# Patient Record
Sex: FEMALE | Race: WHITE | Employment: FULL TIME | URBAN - METROPOLITAN AREA
[De-identification: names, ages, dates, MRNs, and addresses within clinical notes are randomized per-mention and may not be internally consistent; named-entity substitution may affect disease eponyms.]

---

## 2021-10-14 ENCOUNTER — OFFICE VISIT (OUTPATIENT)
Dept: PRIMARY CARE CLINIC | Age: 51
End: 2021-10-14
Payer: COMMERCIAL

## 2021-10-14 VITALS
WEIGHT: 172.6 LBS | HEART RATE: 76 BPM | BODY MASS INDEX: 30.58 KG/M2 | SYSTOLIC BLOOD PRESSURE: 126 MMHG | RESPIRATION RATE: 16 BRPM | DIASTOLIC BLOOD PRESSURE: 84 MMHG | OXYGEN SATURATION: 98 % | HEIGHT: 63 IN

## 2021-10-14 DIAGNOSIS — Z13.0 SCREENING, ANEMIA, DEFICIENCY, IRON: ICD-10-CM

## 2021-10-14 DIAGNOSIS — G47.9 DIFFICULTY SLEEPING: ICD-10-CM

## 2021-10-14 DIAGNOSIS — Z12.11 COLON CANCER SCREENING: ICD-10-CM

## 2021-10-14 DIAGNOSIS — Z13.29 SCREENING FOR THYROID DISORDER: ICD-10-CM

## 2021-10-14 DIAGNOSIS — Z12.31 ENCOUNTER FOR SCREENING MAMMOGRAM FOR BREAST CANCER: ICD-10-CM

## 2021-10-14 DIAGNOSIS — Z13.6 SCREENING FOR CARDIOVASCULAR CONDITION: ICD-10-CM

## 2021-10-14 DIAGNOSIS — Z78.0 POSTMENOPAUSAL: ICD-10-CM

## 2021-10-14 DIAGNOSIS — G44.209 ACUTE NON INTRACTABLE TENSION-TYPE HEADACHE: ICD-10-CM

## 2021-10-14 DIAGNOSIS — F41.9 ANXIETY: Primary | ICD-10-CM

## 2021-10-14 PROCEDURE — 99204 OFFICE O/P NEW MOD 45 MIN: CPT | Performed by: NURSE PRACTITIONER

## 2021-10-14 RX ORDER — TRIAMCINOLONE ACETONIDE 55 UG/1
2 SPRAY, METERED NASAL DAILY
COMMUNITY

## 2021-10-14 RX ORDER — ESCITALOPRAM OXALATE 10 MG/1
10 TABLET ORAL DAILY
Qty: 30 TABLET | Refills: 5 | Status: SHIPPED | OUTPATIENT
Start: 2021-10-14

## 2021-10-14 SDOH — ECONOMIC STABILITY: FOOD INSECURITY: WITHIN THE PAST 12 MONTHS, THE FOOD YOU BOUGHT JUST DIDN'T LAST AND YOU DIDN'T HAVE MONEY TO GET MORE.: NEVER TRUE

## 2021-10-14 SDOH — ECONOMIC STABILITY: FOOD INSECURITY: WITHIN THE PAST 12 MONTHS, YOU WORRIED THAT YOUR FOOD WOULD RUN OUT BEFORE YOU GOT MONEY TO BUY MORE.: NEVER TRUE

## 2021-10-14 ASSESSMENT — SOCIAL DETERMINANTS OF HEALTH (SDOH): HOW HARD IS IT FOR YOU TO PAY FOR THE VERY BASICS LIKE FOOD, HOUSING, MEDICAL CARE, AND HEATING?: NOT HARD AT ALL

## 2021-10-14 ASSESSMENT — ENCOUNTER SYMPTOMS
DIARRHEA: 0
SORE THROAT: 0
TROUBLE SWALLOWING: 0
VOMITING: 0
SINUS PRESSURE: 0
CONSTIPATION: 0
NAUSEA: 0
COUGH: 0
ABDOMINAL PAIN: 0
SHORTNESS OF BREATH: 0
BLOOD IN STOOL: 0
WHEEZING: 0

## 2021-10-14 ASSESSMENT — PATIENT HEALTH QUESTIONNAIRE - PHQ9
2. FEELING DOWN, DEPRESSED OR HOPELESS: 0
SUM OF ALL RESPONSES TO PHQ9 QUESTIONS 1 & 2: 0
SUM OF ALL RESPONSES TO PHQ QUESTIONS 1-9: 0
SUM OF ALL RESPONSES TO PHQ QUESTIONS 1-9: 0
1. LITTLE INTEREST OR PLEASURE IN DOING THINGS: 0
SUM OF ALL RESPONSES TO PHQ QUESTIONS 1-9: 0

## 2021-10-14 NOTE — PROGRESS NOTES
[address] PRIMARY CARE  [address] [facility] 15[address]  [address] [address]  Dept: [phone]  Dept Fax: [phone]    Arpit Williamson is a 46 y.o. female who presentstoday for her medical conditions/complaints as noted below. Arpit Williamson is c/o of  Chief Complaint   Patient presents with   [facility] Patient     Establish Care. HPI:     Here today to establish care as new patient  Minimal past and current medical history, she is not on any medications  She works full-time as a   Reports her spouse is retired/on disability due to Luite Robb 87  She has 3 children who are all in [facility] 85 to follow healthy diet, maintains active lifestyle  Past due for Pap smear and mammogram, has never had colonoscopy    Voicing concern about increased stress, anxiety and difficulty sleeping  She states she has never been on medication or felt this way in the past  She is hesitant about medications, wondering about counseling  Denies any suicidal ideation  States she just feels that it situational given her oldest child is moving to [place], her spouses health is declining, and her new job is very stressful      No results found for: Barronett Amari          ( goal A1C is < 7)   No results found for: LABMICR  No results found for: LDLCHOLESTEROL, [address]    (goal LDL is <100)   No results found for: AST, ALT, BUN  BP Readings from Last 3 Encounters:   10/14/21 126/84          ([id number])    History reviewed. No pertinent past medical history. Past Surgical History:   Procedure Laterality Date    BREAST REDUCTION SURGERY       SECTION      x 3       Family History   Problem Relation Age of Onset    Heart Disease Father           Social History     Tobacco Use    Smoking status: Never Smoker    Smokeless tobacco: Never Used   Substance Use Topics    Alcohol use:  Yes     Alcohol/week: 5.0 standard drinks     Types: 5 Glasses of wine per week      Current Outpatient Medications   Medication Sig Dispense Refill    triamcinolone (NASACORT) 55 MCG/ACT nasal inhaler 2 sprays by Nasal route daily      escitalopram (LEXAPRO) 10 MG tablet Take 1 tablet by mouth daily 30 tablet 5     No current facility-administered medications for this visit. Allergies   Allergen Reactions    Seasonal        Health Maintenance   Topic Date Due    Hepatitis C screen  Never done    HIV screen  Never done    Cervical cancer screen  Never done    Lipid screen  Never done    Diabetes screen  Never done    Colon cancer screen colonoscopy  Never done    Breast cancer screen  Never done    Shingles Vaccine (1 of 2) Never done    Flu vaccine (1) Never done    DTaP/Tdap/Td vaccine (2 - Td or Tdap) 10/22/2028    COVID-19 Vaccine  Completed    Hepatitis A vaccine  Aged Out    Hepatitis B vaccine  Aged Out    Hib vaccine  Aged Out    Meningococcal (ACWY) vaccine  Aged Out    Pneumococcal 0-64 years Vaccine  Aged Out       Subjective:      Review of Systems   Constitutional: Negative for activity change, appetite change, chills, fatigue, fever and unexpected weight change. HENT: Negative for congestion, ear pain, hearing loss, sinus pressure, sore throat and trouble swallowing. Eyes: Negative for visual disturbance. Respiratory: Negative for cough, shortness of breath and wheezing. Cardiovascular: Negative for chest pain, palpitations and leg swelling. Gastrointestinal: Negative for abdominal pain, blood in stool, constipation, diarrhea, nausea and vomiting. Endocrine: Negative for cold intolerance, heat intolerance, polydipsia, polyphagia and polyuria. Genitourinary: Negative for difficulty urinating, frequency, hematuria and urgency. Musculoskeletal: Negative for arthralgias and myalgias. Skin: Negative for rash. Allergic/Immunologic: Positive for environmental allergies. Neurological: Positive for headaches (weekly).  Negative for dizziness, weakness and light-headedness. Psychiatric/Behavioral: Positive for sleep disturbance. Negative for confusion. The patient is not nervous/anxious. Objective:     Physical Exam  Constitutional:       Appearance: She is well-developed. HENT:      Head: Normocephalic. Eyes:      Conjunctiva/sclera: Conjunctivae normal.      Pupils: Pupils are equal, round, and reactive to light. Cardiovascular:      Rate and Rhythm: Normal rate and regular rhythm. Heart sounds: Normal heart sounds. No murmur heard. Pulmonary:      Effort: Pulmonary effort is normal.      Breath sounds: Normal breath sounds. No wheezing. Abdominal:      General: Bowel sounds are normal. There is no distension. Palpations: Abdomen is soft. Musculoskeletal:         General: Normal range of motion. Cervical back: Normal range of motion. Skin:     General: Skin is warm and dry. Neurological:      Mental Status: She is alert and oriented to person, place, and time. Psychiatric:         Behavior: Behavior normal.         Thought Content: Thought content normal.         Judgment: Judgment normal.       /84   Pulse 76   Resp 16   Ht 5' 2.5\" (1.588 m)   Wt 172 lb 9.6 oz (78.3 kg)   SpO2 98%   BMI 31.07 kg/m²     Assessment:       Diagnosis Orders   1. [address]    escitalopram (LEXAPRO) 10 MG tablet   2. Colon cancer screening  [facility]) - Dionisio Caraballo IV, DO, General Surgery, [place]   3. Encounter for screening mammogram for breast cancer  RYNE Digital Screen Bilateral [[id number]]   4. Screening for cardiovascular condition  Comprehensive Metabolic Panel    Lipid Panel   5. Screening for thyroid disorder  TSH without Reflex   6. Screening, anemia, deficiency, iron  CBC Auto Differential   7. Postmenopausal  [facility] [place]   8. Acute non intractable tension-type headache     9.  Difficulty sleeping               Plan:      Return in about 6 weeks (around 11/25/2021) for depression/anxiety. Established care as new patient, she is interested in all health maintenance, screening labs, mammogram and referral for GYN exam and colonoscopy provided  Headaches-managing at this time with over-the-counter ibuprofen. Reviewed benefits of adequate hydration, taking medication at initial onset of headache  Anxiety, difficulty sleeping-lengthy discussion, she is interested in counseling, referral placed. Also provided with prescription for Lexapro, she states she may hold off until she meets with counselor to start the medication.   Discussed benefits of medications, potential side effects and reportable adverse effects  Orders Placed This Encounter   Procedures    RYNE Digital Screen Bilateral [[id number]]     Standing Status:   Future     Standing Expiration Date:   10/14/2022     Order Specific Question:   Reason for exam:     Answer:   screening    CBC Auto Differential     Standing Status:   Future     Standing Expiration Date:   10/14/2022    Comprehensive Metabolic Panel     Standing Status:   Future     Standing Expiration Date:   10/14/2022    Lipid Panel     Standing Status:   Future     Standing Expiration Date:   10/14/2022     Order Specific Question:   Is Patient Fasting?/# of Hours     Answer:   8    TSH without Reflex     Standing Status:   Future     Standing Expiration Date:   10/14/2022   [address] [place] [address]     Referral Priority:   Routine     Referral Type:   Eval and Treat     Referral Reason:   Specialty Services Required     Referred to Provider:   Aarti Petit, [address]     Requested Specialty:   Behavioral Health     Number of Visits Requested:   1    [facility]) - Keyana Palomares DO, General Surgery, [place]     Referral Priority:   Routine     Referral Type:   Eval and Treat     Referral Reason:   Specialty Services Required     Referred to Provider:   Swathi Bryan DO     Requested Specialty:   General Surgery Number of Visits Requested:   [address], CNP, Gynecology, [place] [address] [place]     Referral Priority:   Routine     Referral Type:   Eval and Treat     Referral Reason:   Specialty Services Required     Referred to Provider:   BELINDA Oconnor CNP     Requested Specialty:   Family Nurse Practitioner     Number of Visits Requested:   1        Orders Placed This Encounter   Medications    escitalopram (LEXAPRO) 10 MG tablet     Sig: Take 1 tablet by mouth daily     Dispense:  30 tablet     Refill:  5       Patient given educational materials - see patient instructions. Discussed use, benefit, and side effects of prescribed medications. All patientquestions answered. Pt voiced understanding. Reviewed health maintenance. Instructedto continue current medications, diet and exercise. Patient agreed with treatmentplan. Follow up as directed.      Electronicallysigned by BELINDA Duarte CNP on 10/14/2021 at 12:46 PM

## 2021-10-14 NOTE — PROGRESS NOTES
Is the person to be vaccinated sick today? no    Does the person to be vaccinated have an allergy to eggs or to a component of the vaccine? No    Has the person to be vaccinated ever had a serious reaction to influenza vaccine in the past? Yes    Has the person to be vaccinated ever had Guillan-Selma' Syndrome?  No

## 2021-10-22 ENCOUNTER — CLINICAL DOCUMENTATION (OUTPATIENT)
Dept: BEHAVIORAL/MENTAL HEALTH CLINIC | Age: 51
End: 2021-10-22

## 2021-10-22 NOTE — PROGRESS NOTES
Warm handoff requested by BELINDA Xiao CNP and was completed. Patient scheduled for future virtual health visit.

## 2021-11-30 ENCOUNTER — VIRTUAL VISIT (OUTPATIENT)
Dept: BEHAVIORAL/MENTAL HEALTH CLINIC | Age: 51
End: 2021-11-30
Payer: COMMERCIAL

## 2021-11-30 DIAGNOSIS — F41.9 ANXIETY DISORDER, UNSPECIFIED TYPE: Primary | ICD-10-CM

## 2021-11-30 PROCEDURE — 90791 PSYCH DIAGNOSTIC EVALUATION: CPT | Performed by: SOCIAL WORKER

## 2021-12-10 NOTE — PROGRESS NOTES
ADULT BEHAVIORAL HEALTH ASSESSMENT  MARYBETH Mcgrath  Licensed Independent      Visit Date: 2021   Time of appointment: 2pm   Time spent with Patient: 60 minutes. This is patient's first appointment. Reason for Consult:  New Patient     PCP: BELINDA Landin CNP      Pt and/or guardian was educated on the model of service to include a short-term intervention focused approach and as well as the limits of confidentiality (i.e. abuse reporting, suicide intervention, etc.). Pt and/or guardian indicated understanding. Pt and/or guardian provided informed consent for the behavioral health program.  Visit completed via audio and video and consent for virtual visit was given. .   Patient Location: privately in pt home, [facility]  Provider Location: [place] [facility] office, Count includes the [facility]  Penny Cedillo is a 46 y.o. female who presents for new evaluation and treatment of anxiety and stress. She reports the following symptoms: frequent worry about a variety of things, scattered, difficulty completing a task well, overwhelmed, waking easily at night and difficulty returning to sleep, racing mind. Symptoms are causing impairment in her activities of daily living and family relationships. Onset of symptoms was approximately 8 . Symptoms occur daily and have been gradually worsening since onset. Stressors contributing to the presenting problem include: Pt brings up issues in family of origin and states that brother  of a drug overdose around the time symptoms began. Pt is frequently stressed and worrying about her family and the living brother that contacts her throughout the week who is also dealing with addiction issues. Penny Cedillo reports that her main concern and focus for the referral to [facility] is to get anxiety and worrying under control.     CURRENT MEDICATIONS    Current Outpatient Medications:     triamcinolone (NASACORT) 54 MCG/ACT nasal inhaler, 2 sprays by Nasal route daily, Disp: , Rfl:     escitalopram (LEXAPRO) 10 MG tablet, Take 1 tablet by mouth daily, Disp: 30 tablet, Rfl: 5     FAMILY MEDICAL/MH HISTORY   Her family history includes Heart Disease in her father. [address] reports a previous diagnosis of anxiety. No previous treatment. PSYCHOSOCIAL HISTORY  Giovanna Yañez is currently living with , who has MS. She has several young adult children. She is employed in a stressful job. She reports no previous history of trauma. 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 She has never used smokeless tobacco.  ALCOHOL:  She reports current alcohol use of about 5.0 standard drinks of alcohol per week. OTHER SUBSTANCES: She reports no history of drug use. MENTAL STATUS EXAM  Affective and emotional state appeared anxious. Suicidal ideation was denied. Homicidal ideation was denied. Hygiene was good   Dress was appropriate  Behavior was Calm and engaged  Attitude was Cooperative. Eye-contact was good . Speech is described as normal rate, normal volume and well articulated  Thought processes were logical without evidence of delusions, hallucinations, obsessions, or loree  Pt was oriented to person, place, time, and general circumstances with recent memory:  good and remote memory:  good. Insight is estimated to be good AEB a good  understanding of cyclical maladaptive patterns, and the ability to use insight to inform behavior change.    Judgment was estimated to be good    PHQ Scores 10/14/2021   PHQ2 Score 0   PHQ9 Score 0     Interpretation of Total Score Depression Severity: 1-4 = Minimal depression, 5-9 = Mild depression, 10-14 = Moderate depression, 15-19 = Moderately severe depression, 20-27 = Severe depression    ASSESSMENT  Escobar Michaels presented to the appointment today for evaluation and treatment of symptoms of anxiety. She is currently deemed no risk to herself or others and meets criteria for:  Anxiety Disorder, unspecified, AEB presence of anxiety symptoms causing significant distress without meeting full criteria for a specific anxiety disorder. She will benefit from will benefit from brief and solution-focused consultation to address cognitive and behavioral interventions for anxiety symptoms. Erinn Robins and/or guardian were in agreement with recommendations. INTERVENTION  orienting pt to process of brief behavioral health services, completing initial assessment of symptoms and needs, provided recommendations, rapport building and guided discovery    DIAGNOSIS  Erinn Robins was seen today for new patient. Diagnoses and all orders for this visit:    Anxiety disorder, unspecified type        PLAN  Pt will track worry themes/triggers  Follow up in 2 weeks with Eric Krueger 88  Is interactive complexity present?   No  Reason:  N/A  Additional Supporting Information:  N/A       Electronically signed by Vinh Marti on 12/10/2021 at 1:31 PM

## 2021-12-15 ENCOUNTER — VIRTUAL VISIT (OUTPATIENT)
Dept: BEHAVIORAL/MENTAL HEALTH CLINIC | Age: 51
End: 2021-12-15
Payer: COMMERCIAL

## 2021-12-15 DIAGNOSIS — F41.9 ANXIETY DISORDER, UNSPECIFIED TYPE: Primary | ICD-10-CM

## 2021-12-15 PROCEDURE — 90837 PSYTX W PT 60 MINUTES: CPT | Performed by: SOCIAL WORKER

## 2021-12-16 ENCOUNTER — OFFICE VISIT (OUTPATIENT)
Dept: OBGYN CLINIC | Age: 51
End: 2021-12-16
Payer: COMMERCIAL

## 2021-12-16 ENCOUNTER — HOSPITAL ENCOUNTER (OUTPATIENT)
Age: 51
Setting detail: SPECIMEN
Discharge: HOME | End: 2021-12-16

## 2021-12-16 VITALS
HEIGHT: 63 IN | DIASTOLIC BLOOD PRESSURE: 70 MMHG | WEIGHT: 170 LBS | SYSTOLIC BLOOD PRESSURE: 120 MMHG | BODY MASS INDEX: 30.12 KG/M2

## 2021-12-16 DIAGNOSIS — N89.8 VAGINAL DRYNESS: ICD-10-CM

## 2021-12-16 DIAGNOSIS — Z01.419 ENCOUNTER FOR ANNUAL ROUTINE GYNECOLOGICAL EXAMINATION: Primary | ICD-10-CM

## 2021-12-16 PROCEDURE — 99386 PREV VISIT NEW AGE 40-64: CPT | Performed by: NURSE PRACTITIONER

## 2021-12-16 ASSESSMENT — ENCOUNTER SYMPTOMS
COUGH: 0
COLOR CHANGE: 0
DIARRHEA: 0
SHORTNESS OF BREATH: 0
VOMITING: 0
ABDOMINAL PAIN: 0
CONSTIPATION: 0
NAUSEA: 0

## 2021-12-16 NOTE — PROGRESS NOTES
Arpit Williamson is a 46 y.o.  here for her annual exam.  The patient was seen and examined. The patients past medical, surgical, social and family history were reviewed. Current medications and allergies were reviewed, and documented in the chart. She works full-time as a   Reports her spouse is retired/on disability due to Luite Robb 87  She has 3 children 2 in college at [employer]      Tobacco abuse No    Last PAP: 3-4 years ago states was normal, hx of abnormal PAP no  Family hx uterine or ovarian cancer-denies  Last mammogram if indicated-around 5 years ago, Family hx of breast cancer -MGM    Colon cancer screening if indicated-has never had scheduled for colonoscopy 22 family hx colon cancer -denies        Sexually active: yes. Dyspareunia: Dryness at times lubricants help encourage replens vaginal moisturizer also Vaginal discharge: no,  UTI symptoms: no, voiding difficulties: no, bowels regular:Yes bloating:no      Menstrual history: Postmenopausal- LMP [id number]? Denies vaginal bleeding  She states + hot flashes but have improved, toelrable    OB History    Para Term  AB Living   4 3 3   1 3   SAB IAB Ectopic Molar Multiple Live Births   1                # Outcome Date GA Lbr Adarsh/2nd Weight Sex Delivery Anes PTL Lv   4 Term      CS-Unspec      3 Term      CS-Unspec      2 Term      CS-Unspec      1 SAB                Vitals:    21 1043   BP: 120/70   Site: Right Upper Arm   Position: Sitting   Cuff Size: Small Adult   Weight: 170 lb (77.1 kg)   Height: 5' 3\" (1.6 m)       Wt Readings from Last 3 Encounters:   21 170 lb (77.1 kg)   21 173 lb (78.5 kg)   10/14/21 172 lb 9.6 oz (78.3 kg)     History reviewed. No pertinent past medical history.                                                                 Past Surgical History:   Procedure Laterality Date    BREAST REDUCTION SURGERY       SECTION      x 3     Family History   Problem Relation Age of Onset    Heart Disease Father      Social History     Tobacco Use   Smoking Status Never Smoker   Smokeless Tobacco Never Used     Social History     Substance and Sexual Activity   Alcohol Use Yes    Alcohol/week: 5.0 standard drinks    Types: 5 Glasses of wine per week        Social History     Tobacco History     Smoking Status  Never Smoker    Smokeless Tobacco Use  Never Used          Alcohol History     Alcohol Use Status  Yes Drinks/Week  5 Glasses of wine per week Amount  5.0 standard drinks of alcohol/wk          Drug Use     Drug Use Status  Never          Sexual Activity     Sexually Active  Not Asked              Allergies   Allergen Reactions    Seasonal      Current Outpatient Medications   Medication Sig Dispense Refill    triamcinolone (NASACORT) 55 MCG/ACT nasal inhaler 2 sprays by Nasal route daily      escitalopram (LEXAPRO) 10 MG tablet Take 1 tablet by mouth daily 30 tablet 5     No current facility-administered medications for this visit. Subjective:     Review of Systems   Constitutional: Negative for chills, fatigue, fever and unexpected weight change. Respiratory: Negative for cough and shortness of breath. Cardiovascular: Negative for chest pain, palpitations and leg swelling. Gastrointestinal: Negative for abdominal pain, constipation, diarrhea, nausea and vomiting. Endocrine: Positive for heat intolerance. Negative for cold intolerance. Genitourinary: Negative for dyspareunia (+ dryness), dysuria, flank pain, menstrual problem, pelvic pain, vaginal bleeding, vaginal discharge and vaginal pain. Skin: Negative for color change and rash. Neurological: Negative for dizziness, light-headedness and headaches. Hematological: Negative for adenopathy. Does not bruise/bleed easily. Psychiatric/Behavioral: Negative for self-injury and suicidal ideas. Objective:     Physical Exam  Vitals and nursing note reviewed. Constitutional:       General: She is not in acute distress. Appearance: She is well-developed. She is not diaphoretic. HENT:      Head: Normocephalic and atraumatic. Right Ear: External ear normal.      Left Ear: External ear normal.      Nose: Nose normal.   Eyes:      Pupils: Pupils are equal, round, and reactive to light. Neck:      Thyroid: No thyromegaly. Cardiovascular:      Rate and Rhythm: Normal rate and regular rhythm. Heart sounds: Normal heart sounds. No murmur heard. No friction rub. No gallop. Comments: No bilateral calf tenderness or swelling  Pulmonary:      Effort: Pulmonary effort is normal. No respiratory distress. Breath sounds: Normal breath sounds. No wheezing. Abdominal:      General: Bowel sounds are normal.      Palpations: Abdomen is soft. Tenderness: There is no abdominal tenderness. Genitourinary:     Comments: Breasts nipples everted, no masses or tenderness, does BSE  Vulva-no lesions  Vagina-pale thin  Cervix-firm,  Nontender, freely movable, no lesions  Uterus-ant. Smooth, firm, nontender, freely movable  Adnexa-no masses or tenderness   Musculoskeletal:         General: Normal range of motion. Cervical back: Normal range of motion and neck supple. Lymphadenopathy:      Cervical: No cervical adenopathy. Skin:     General: Skin is warm and dry. Findings: No rash. Neurological:      Mental Status: She is alert and oriented to person, place, and time. Cranial Nerves: No cranial nerve deficit. Deep Tendon Reflexes: Reflexes are normal and symmetric. Psychiatric:         Behavior: Behavior normal.         Thought Content: Thought content normal.         Judgment: Judgment normal.       /70 (Site: Right Upper Arm, Position: Sitting, Cuff Size: Small Adult)   Ht 5' 3\" (1.6 m)   Wt 170 lb (77.1 kg)   BMI 30.11 kg/m²     Assessment:       Diagnosis Orders   1. Encounter for annual routine gynecological examination  PAP SMEAR   2. Vaginal dryness         Breast exam completed. Pelvic exam pap smear collected and sent. Cultures sent No    Plan:   Collect pap   BSE reviewed, Mammogram ordered: has order from pcp    Cultures declined   Vaginal dryness trial replens no improvement schedule discuss alternate therapies  Diet & Exercise reviewed with pt. DEXA SCAN ordered: in 1-2 years  Recommend Calcium with Vitamin D   Colonoscopy reviewed with pt - has scheduled  Preventive  Health through PCP   RV prn/annual           Orders Placed This Encounter   Procedures    PAP SMEAR     Patient History:    No LMP recorded. Patient is postmenopausal.  OBGYN Status: Postmenopausal  Past Surgical History:  No date: BREAST REDUCTION SURGERY  No date:  SECTION      Comment:  x 3      Social History    Tobacco Use      Smoking status: Never Smoker      Smokeless tobacco: Never Used       Standing Status:   Future     Standing Expiration Date:   2022     Order Specific Question:   Collection Type     Answer: Thin Prep     Order Specific Question:   Prior Abnormal Pap Test     Answer:   No     Order Specific Question:   Screening or Diagnostic     Answer:   Screening     Order Specific Question:   HPV Requested? Answer:   Yes     Order Specific Question:   High Risk Patient     Answer:   N/A     No orders of the defined types were placed in this encounter. Patient given educational materials - seepatient instructions. Discussed use, benefit, and side effects of prescribed medications. All patient questions answered. Pt voiced understanding. Reviewed health maintenance. Instructed to continue current medications, diet and exercise. Patient agreedwith treatment plan. Follow up as directed.       Electronically signed by BELINDA Miguel CNP on t 10:58 AM

## 2021-12-16 NOTE — PATIENT INSTRUCTIONS
Preventing Osteoporosis: Care Instructions  Your Care Instructions    Osteoporosis means the bones are weak and thin enough that they can break easily. The older you are, the more likely you are to get osteoporosis. But with plenty of calcium, vitamin D, and exercise, you can help prevent osteoporosis. The preteen and teen years are a key time for bone building. With the help of calcium, vitamin D, and exercise in those early years and beyond, the bones reach their peak density and strength by age 27. After age 27, your bones naturally start to thin and weaken. The stronger your bones are at around age 27, the lower your risk for osteoporosis. But no matter what your age and risk are, your bones still need calcium, vitamin D, and exercise to stay strong. Also avoid smoking, and limit alcohol. Smoking and heavy alcohol use can make your bones thinner. Talk to your doctor about any special risks you might have, such as having a close relative with osteoporosis or taking a medicine that can weaken bones. Your doctor can tell you the best ways to protect your bones from thinning. Follow-up care is a key part of your treatment and safety. Be sure to make and go to all appointments, and call your doctor if you are having problems. It's also a good idea to know your test results and keep a list of the medicines you take. How can you care for yourself at home? · Get enough calcium and vitamin D. The [place] of [employer] recommends adults younger than age 46 need 1,000 mg of calcium and 600 IU of vitamin D each day. Women ages 46 to 79 need 1,200 mg of calcium and 600 IU of vitamin D each day. Men ages 46 to 79 need 1,000 mg of calcium and 600 IU of vitamin D each day. Adults 71 and older need 1,200 mg of calcium and 800 IU of vitamin D each day. ? Eat foods rich in calcium, like yogurt, cheese, milk, and dark green vegetables. ? Eat foods rich in vitamin D, like eggs, fatty fish, cereal, and fortified milk.   ? Get 1219-5057 Healthwise, Incorporated. Care instructions adapted under license by [facility] ([facility]). If you have questions about a medical condition or this instruction, always ask your healthcare professional. [address] any warranty or liability for your use of this information. Learning About Breast Cancer Screening  What is breast cancer screening? Breast cancer occurs when cells that are not normal grow in one or both of your breasts. Screening tests can help find breast cancer early. Cancer is easier to treat when it's found early. Having concerns about breast cancer is common. That's why it's important to talk with your doctor about when to start and how often to get screened for breast cancer. How is breast cancer screening done? Several screening tests can be used to check for breast cancer. · Mammograms check for signs of cancer using X-rays. They can show tumors that are too small for you or your doctor to feel. During a mammogram, a machine squeezes your breasts to make them flatter and easier to X-ray. At least two pictures are taken of each breast. One is taken from the top and one from the side. · 3-D mammograms are also called digital breast tomosynthesis. Your breast is positioned on a flat plate. A top plate is pressed against your breast to keep it in position. The X-ray arm then moves in an arc above the breast and takes many pictures. A computer uses these X-rays to create a three-dimensional image. · Clinical breast exams are a doctor's exam. Your doctor carefully feels your breasts and under your arms to check for lumps or other changes. After the screening, your doctor will tell you the results. You will also be told if you need any follow-up tests. When should you get screened? Talk with your doctor about when you should start being tested for breast cancer. How often you get tested and the kind of tests you get will depend on your age and your risk.   The guidelines that follow are for women who have an average risk for breast cancer. If you have a higher risk for breast cancer, such as having a family history of breast cancer in multiple relatives or at a young age, your doctor may recommend different screening for you. · Ages 21 to 44: Some experts recommend that women have a clinical breast exam every 3 years, starting at age 21. Ask your doctor how often you should have this test. If you have a high risk for breast cancer, talk with your doctor about when to start yearly mammograms and other screening tests. · Ages 36 and older: Talk with your doctor about how often you should have mammograms and clinical breast exams. What is your risk for breast cancer? If you don't already know your risk of breast cancer, you can ask your doctor about it. You can also look it up at www.cancer.gov/bcrisktool/. If your doctor says that you have a high or very high risk, ask about ways to reduce your risk. These could include getting extra screening, taking medicine, or having surgery. If you have a strong family history of breast cancer, ask your doctor about genetic testing. What steps can you take to stay healthy? Some things that increase your risk of breast cancer, such as your age and being female, cannot be controlled. But you can do some things to stay as healthy as you can. · Learn what your breasts normally look and feel like. If you notice any changes, tell your doctor. · Drink alcohol wisely. Your risk goes up the more you drink. For the best health, women should have no more than 1 drink a day or 7 drinks a week. · If you smoke, quit. When you quit smoking, you lower your chances of getting many types of cancer. You can also do your best to eat well, be active, and stay at a healthy weight. Eating healthy foods and being active every day, as well as staying at a healthy weight, may help prevent cancer. Where can you learn more?   Go to https://chpepiceweb.[email or web address]. org and sign in to your SumAll account. Enter Q769 in the Arrayithire box to learn more about \"Learning About Breast Cancer Screening. \"     If you do not have an account, please click on the \"Sign Up Now\" link. Current as of: December 19, 2018  Content Version: 12.0  © 9652-5199 Starline. Care instructions adapted under license by [facility] ([facility]). If you have questions about a medical condition or this instruction, always ask your healthcare professional. [address] any warranty or liability for your use of this information. Pap Test: Care Instructions  Your Care Instructions    The Pap test (also called a Pap smear) is a screening test for cancer of the cervix, which is the lower part of the uterus that opens into the vagina. The test can help your doctor find early changes in the cells that could lead to cancer. The sample of cells taken during your test has been sent to a lab so that an expert can look at the cells. It usually takes a week or two to get the results back. Follow-up care is a key part of your treatment and safety. Be sure to make and go to all appointments, and call your doctor if you are having problems. It's also a good idea to know your test results and keep a list of the medicines you take. What do the results mean? · A normal result means that the test did not find any abnormal cells in the sample. · An abnormal result can mean many things. Most of these are not cancer. The results of your test may be abnormal because:  ? You have an infection of the vagina or cervix, such as a yeast infection. ? You have an IUD (intrauterine device for birth control). ? You have low estrogen levels after menopause that are causing the cells to change. ? You have cell changes that may be a sign of precancer or cancer. The results are ranked based on how serious the changes might be.   There are many other reasons why you might not get a normal result. If the results were abnormal, you may need to get another test within a few weeks or months. If the results show changes that could be a sign of cancer, you may need a test called a colposcopy, which provides a more complete view of the cervix. Sometimes the lab cannot use the sample because it does not contain enough cells or was not preserved well. If so, you may need to have the test again. This is not common, but it does happen from time to time. When should you call for help? Watch closely for changes in your health, and be sure to contact your doctor if:    · You have vaginal bleeding or pain for more than 2 days after the test. It is normal to have a small amount of bleeding for a day or two after the test.   Where can you learn more? Go to https://Trapeze NetworkspeHaozu.com.Landscape Mobile. org and sign in to your BabyBus account. Enter V075 in the smartfundit.com box to learn more about \"Pap Test: Care Instructions. \"     If you do not have an account, please click on the \"Sign Up Now\" link. Current as of: December 19, 2018  Content Version: 12.0  © 8955-2447 Healthwise, Incorporated. Care instructions adapted under license by [facility] ([facility]). If you have questions about a medical condition or this instruction, always ask your healthcare professional. [address] any warranty or liability for your use of this information.

## 2021-12-17 LAB
HPV SAMPLE: NORMAL
HPV, GENOTYPE 16: NOT DETECTED
HPV, GENOTYPE 18: NOT DETECTED
HPV, HIGH RISK OTHER: NOT DETECTED
HPV, INTERPRETATION: NORMAL
SPECIMEN DESCRIPTION: NORMAL

## 2021-12-28 NOTE — PROGRESS NOTES
BEHAVIORAL HEALTH FOLLOW UP  MARYBETH Rothman  Behavioral Health Consultant      Visit Date: 12/15/2021   Time of appointment:  9am   Time spent with Patient: 55 minutes. This is patient's second appointment. Pt and/or guardian was educated on the model of service to include a short-term intervention focused approach and as well as the limits of confidentiality (i.e. abuse reporting, suicide intervention, etc.). Pt and/or guardian indicated understanding. Pt and/or guardian provided informed consent for the behavioral health program.  Visit completed via audio and video and consent for virtual visit was given. .   Patient Location: privately in pt home, [facility]  Provider Location: [facility], [facility]    PCP: BELINDA Hall CNP      Reason for Consult: Follow-up    to address pt's Behavioral Health goal: \"get anxiety and worrying under control\"      Tari Naylor is a 46 y.o. female who presents for follow up of anxiety. She reports symptoms remain persistent., She reports recent vacation was a help to relax. Today pt further discusses impact of family of origin on stress and anxiety. OBJECTIVE  Affective and emotional state appeared tearful. Suicidal thoughts or behaviors not present  Homicidal thoughts or behaviors not present  Hygiene was good   Dress was appropriate  Behavior was Calm and engaged  Attitude was Cooperative. Eye-contact was good . Speech is described as normal rate, normal volume and well articulated  Thought processes were logical without evidence of delusions, hallucinations, obsessions, or loree  Pt was oriented to person, place, time, and general circumstances with recent memory:  good and remote memory:  good.   Insight and judgment are estimated to be good     PHQ Scores 10/14/2021   PHQ2 Score 0   PHQ9 Score 0     Interpretation of Total Score Depression Severity: 1-4 = Minimal depression, 5-9 = Mild depression, 10-14 = Moderate depression, 15-19 = Moderately severe depression, 20-27 = Severe depression    ASSESSMENT  Lea Hernandez presented to the appointment today for follow up and treatment of anxiety. She is currently deemed no risk to self or others. The main focus or themes of the visit today included working through of unhelpful inhibitions and avoidances, expression and discussion of unhelpful thinking patterns or schemas and coping with relationship difficulties. Jax Hubbard continues to gain greater mastery over distressing symptoms. She would benefit from further behavioral health consultation to address anxiety. Jax Hubbard was in agreement with recommendations. DIAGNOSIS  Jax Hubbard was seen today for follow-up. Diagnoses and all orders for this visit:    Anxiety disorder, unspecified type        INTERVENTION  Therapeutic strategies included encouragement of expression of emotion, building awareness, evaluation and challenging of problematic thinking patterns, guided discovery and training in communication and interpersonal skills. PLAN  Follow up in 2 weeks with [facility]  Is interactive complexity present?  No  Reason:  N/A  Additional Supporting Information:  N/A       Electronically signed by Katey Guidry on 12/28/2021 at 11:13 AM

## 2021-12-29 LAB — CYTOLOGY REPORT: NORMAL

## 2022-01-06 ENCOUNTER — VIRTUAL VISIT (OUTPATIENT)
Dept: BEHAVIORAL/MENTAL HEALTH CLINIC | Age: 52
End: 2022-01-06
Payer: COMMERCIAL

## 2022-01-06 DIAGNOSIS — F41.9 ANXIETY DISORDER, UNSPECIFIED TYPE: Primary | ICD-10-CM

## 2022-01-06 PROCEDURE — 90834 PSYTX W PT 45 MINUTES: CPT | Performed by: SOCIAL WORKER

## 2022-01-06 NOTE — PROGRESS NOTES
BEHAVIORAL HEALTH FOLLOW UP  MARYBETH Temple  Behavioral Health Consultant      Visit Date: 1/6/2022   Time of appointment:  9am   Time spent with Patient: 40 minutes. This is patient's third appointment. Pt and/or guardian was educated on the model of service to include a short-term intervention focused approach and as well as the limits of confidentiality (i.e. abuse reporting, suicide intervention, etc.). Pt and/or guardian indicated understanding. Pt and/or guardian provided informed consent for the behavioral health program.  Visit completed via audio and video and consent for virtual visit was given. .   Patient Location: privately in pt home, [facility]  Provider Location: [facility], [facility]    PCP: BELINDA Duffy CNP      Reason for Consult: Follow-up    to address pt's Behavioral Health goal: \"get anxiety and worrying under control\"    Lexus Long is a 46 y.o. female who presents for follow up of anxiety. She reports symptoms improving., She reports feeling more at a place of acceptance re: family of origin difficulties. Discusses how the holiday went with them and that although she felt disconnected she didn't feel like her anxiety or emotions were in control. States overall doing better. OBJECTIVE  Affective and emotional state appeared generally positive. Suicidal thoughts or behaviors not present  Homicidal thoughts or behaviors not present  Hygiene was good   Dress was appropriate  Behavior was Calm and engaged  Attitude was Cooperative. Eye-contact was good . Speech is described as normal rate, normal volume and well articulated  Thought processes were logical without evidence of delusions, hallucinations, obsessions, or loree  Pt was oriented to person, place, time, and general circumstances with recent memory:  good and remote memory:  good.   Insight and judgment are estimated to be good     PHQ Scores 10/14/2021   PHQ2 Score 0   PHQ9 Score 0     Interpretation of Total Score Depression Severity: 1-4 = Minimal depression, 5-9 = Mild depression, 10-14 = Moderate depression, 15-19 = Moderately severe depression, 20-27 = Severe depression    ASSESSMENT  Roz Washington presented to the appointment today for follow up and treatment of anxiety. She is currently deemed no risk to self or others. The main focus or themes of the visit today included reduction in anxiety, worry and panic. Rekha Green continues to gain greater mastery over distressing symptoms and shows reduction in symptoms. She would benefit from further behavioral health consultation to address anxiety. Pt is doing better so [facility] and pt agreed on a longer time until the next visit. If things worsen before then she can move up the appt. Rekha Green was in agreement with recommendations. DIAGNOSIS  Rekha Green was seen today for follow-up. Diagnoses and all orders for this visit:    Anxiety disorder, unspecified type        INTERVENTION  Therapeutic strategies included encouragement of expression of emotion, exploring and encouraging use of practices that can support symptom management and personal growth in daily life , engaging pt in identifying their strengths and resources, identifying exceptions to the identified problem, therapeutic feedback regarding development and progress and review of work done by patient between visits. PLAN  Follow up in 6 weeks with [address] [place] [address]  Is interactive complexity present?  No  Reason:  N/A  Additional Supporting Information:  N/A       Electronically signed by Danielle Joshua on 1/6/2022 at 9:46 AM

## 2022-01-20 ENCOUNTER — ANESTHESIA EVENT (OUTPATIENT)
Dept: OPERATING ROOM | Age: 52
End: 2022-01-20
Payer: COMMERCIAL

## 2022-01-24 ENCOUNTER — HOSPITAL ENCOUNTER (OUTPATIENT)
Age: 52
Setting detail: OUTPATIENT SURGERY
Discharge: HOME | End: 2022-01-24
Attending: SURGERY | Admitting: SURGERY
Payer: COMMERCIAL

## 2022-01-24 ENCOUNTER — ANESTHESIA (OUTPATIENT)
Dept: OPERATING ROOM | Age: 52
End: 2022-01-24
Payer: COMMERCIAL

## 2022-01-24 VITALS
RESPIRATION RATE: 10 BRPM | OXYGEN SATURATION: 100 % | HEIGHT: 62 IN | DIASTOLIC BLOOD PRESSURE: 85 MMHG | TEMPERATURE: 97.2 F | BODY MASS INDEX: 31.28 KG/M2 | WEIGHT: 170 LBS | HEART RATE: 64 BPM | SYSTOLIC BLOOD PRESSURE: 107 MMHG

## 2022-01-24 VITALS
OXYGEN SATURATION: 98 % | DIASTOLIC BLOOD PRESSURE: 44 MMHG | SYSTOLIC BLOOD PRESSURE: 72 MMHG | RESPIRATION RATE: 12 BRPM

## 2022-01-24 PROCEDURE — 3609027000 HC COLONOSCOPY: Performed by: SURGERY

## 2022-01-24 PROCEDURE — 7100000000 HC PACU RECOVERY - FIRST 15 MIN: Performed by: SURGERY

## 2022-01-24 PROCEDURE — 7100000010 HC PHASE II RECOVERY - FIRST 15 MIN: Performed by: SURGERY

## 2022-01-24 PROCEDURE — 2580000003 HC RX 258: Performed by: ANESTHESIOLOGY

## 2022-01-24 PROCEDURE — 3700000001 HC ADD 15 MINUTES (ANESTHESIA): Performed by: SURGERY

## 2022-01-24 PROCEDURE — 2709999900 HC NON-CHARGEABLE SUPPLY: Performed by: SURGERY

## 2022-01-24 PROCEDURE — 3700000000 HC ANESTHESIA ATTENDED CARE: Performed by: SURGERY

## 2022-01-24 PROCEDURE — 2500000003 HC RX 250 WO HCPCS: Performed by: NURSE ANESTHETIST, CERTIFIED REGISTERED

## 2022-01-24 PROCEDURE — 6360000002 HC RX W HCPCS: Performed by: NURSE ANESTHETIST, CERTIFIED REGISTERED

## 2022-01-24 PROCEDURE — 7100000011 HC PHASE II RECOVERY - ADDTL 15 MIN: Performed by: SURGERY

## 2022-01-24 RX ORDER — PROMETHAZINE HYDROCHLORIDE 25 MG/ML
6.25 INJECTION, SOLUTION INTRAMUSCULAR; INTRAVENOUS
Status: DISCONTINUED | OUTPATIENT
Start: 2022-01-24 | End: 2022-01-24 | Stop reason: HOSPADM

## 2022-01-24 RX ORDER — HYDRALAZINE HYDROCHLORIDE 20 MG/ML
5 INJECTION INTRAMUSCULAR; INTRAVENOUS EVERY 10 MIN PRN
Status: DISCONTINUED | OUTPATIENT
Start: 2022-01-24 | End: 2022-01-24 | Stop reason: HOSPADM

## 2022-01-24 RX ORDER — SODIUM CHLORIDE 0.9 % (FLUSH) 0.9 %
10 SYRINGE (ML) INJECTION PRN
Status: DISCONTINUED | OUTPATIENT
Start: 2022-01-24 | End: 2022-01-24 | Stop reason: HOSPADM

## 2022-01-24 RX ORDER — FENTANYL CITRATE 50 UG/ML
25 INJECTION, SOLUTION INTRAMUSCULAR; INTRAVENOUS EVERY 5 MIN PRN
Status: DISCONTINUED | OUTPATIENT
Start: 2022-01-24 | End: 2022-01-24 | Stop reason: HOSPADM

## 2022-01-24 RX ORDER — SODIUM CHLORIDE, SODIUM LACTATE, POTASSIUM CHLORIDE, CALCIUM CHLORIDE 600; 310; 30; 20 MG/100ML; MG/100ML; MG/100ML; MG/100ML
INJECTION, SOLUTION INTRAVENOUS CONTINUOUS
Status: DISCONTINUED | OUTPATIENT
Start: 2022-01-24 | End: 2022-01-24 | Stop reason: HOSPADM

## 2022-01-24 RX ORDER — PROPOFOL 10 MG/ML
INJECTION, EMULSION INTRAVENOUS PRN
Status: DISCONTINUED | OUTPATIENT
Start: 2022-01-24 | End: 2022-01-24 | Stop reason: SDUPTHER

## 2022-01-24 RX ORDER — ONDANSETRON 2 MG/ML
4 INJECTION INTRAMUSCULAR; INTRAVENOUS
Status: DISCONTINUED | OUTPATIENT
Start: 2022-01-24 | End: 2022-01-24 | Stop reason: HOSPADM

## 2022-01-24 RX ORDER — SODIUM CHLORIDE 0.9 % (FLUSH) 0.9 %
10 SYRINGE (ML) INJECTION EVERY 12 HOURS SCHEDULED
Status: DISCONTINUED | OUTPATIENT
Start: 2022-01-24 | End: 2022-01-24 | Stop reason: HOSPADM

## 2022-01-24 RX ORDER — SODIUM CHLORIDE 9 MG/ML
25 INJECTION, SOLUTION INTRAVENOUS PRN
Status: DISCONTINUED | OUTPATIENT
Start: 2022-01-24 | End: 2022-01-24 | Stop reason: HOSPADM

## 2022-01-24 RX ORDER — MEPERIDINE HYDROCHLORIDE 50 MG/ML
12.5 INJECTION INTRAMUSCULAR; INTRAVENOUS; SUBCUTANEOUS EVERY 5 MIN PRN
Status: DISCONTINUED | OUTPATIENT
Start: 2022-01-24 | End: 2022-01-24 | Stop reason: HOSPADM

## 2022-01-24 RX ORDER — HYDROCODONE BITARTRATE AND ACETAMINOPHEN 5; 325 MG/1; MG/1
1 TABLET ORAL PRN
Status: DISCONTINUED | OUTPATIENT
Start: 2022-01-24 | End: 2022-01-24 | Stop reason: HOSPADM

## 2022-01-24 RX ORDER — HYDROCODONE BITARTRATE AND ACETAMINOPHEN 5; 325 MG/1; MG/1
2 TABLET ORAL PRN
Status: DISCONTINUED | OUTPATIENT
Start: 2022-01-24 | End: 2022-01-24 | Stop reason: HOSPADM

## 2022-01-24 RX ORDER — LIDOCAINE HYDROCHLORIDE 10 MG/ML
INJECTION, SOLUTION EPIDURAL; INFILTRATION; INTRACAUDAL; PERINEURAL PRN
Status: DISCONTINUED | OUTPATIENT
Start: 2022-01-24 | End: 2022-01-24 | Stop reason: SDUPTHER

## 2022-01-24 RX ORDER — MORPHINE SULFATE 1 MG/ML
1 INJECTION, SOLUTION EPIDURAL; INTRATHECAL; INTRAVENOUS EVERY 5 MIN PRN
Status: DISCONTINUED | OUTPATIENT
Start: 2022-01-24 | End: 2022-01-24 | Stop reason: HOSPADM

## 2022-01-24 RX ORDER — SODIUM CHLORIDE 9 MG/ML
INJECTION, SOLUTION INTRAVENOUS CONTINUOUS
Status: DISCONTINUED | OUTPATIENT
Start: 2022-01-24 | End: 2022-01-24 | Stop reason: HOSPADM

## 2022-01-24 RX ORDER — DIPHENHYDRAMINE HYDROCHLORIDE 50 MG/ML
12.5 INJECTION INTRAMUSCULAR; INTRAVENOUS
Status: DISCONTINUED | OUTPATIENT
Start: 2022-01-24 | End: 2022-01-24 | Stop reason: HOSPADM

## 2022-01-24 RX ADMIN — LIDOCAINE HYDROCHLORIDE 50 MG: 10 INJECTION, SOLUTION EPIDURAL; INFILTRATION; INTRACAUDAL; PERINEURAL at 09:08

## 2022-01-24 RX ADMIN — LIDOCAINE HYDROCHLORIDE 20 MG: 10 INJECTION, SOLUTION EPIDURAL; INFILTRATION; INTRACAUDAL; PERINEURAL at 09:19

## 2022-01-24 RX ADMIN — PROPOFOL INJECTABLE EMULSION 100 MCG/KG/MIN: 10 INJECTION, EMULSION INTRAVENOUS at 09:08

## 2022-01-24 RX ADMIN — SODIUM CHLORIDE, POTASSIUM CHLORIDE, SODIUM LACTATE AND CALCIUM CHLORIDE: 600; 310; 30; 20 INJECTION, SOLUTION INTRAVENOUS at 08:15

## 2022-01-24 ASSESSMENT — PULMONARY FUNCTION TESTS
PIF_VALUE: 0
PIF_VALUE: 1
PIF_VALUE: 0

## 2022-01-24 ASSESSMENT — PAIN - FUNCTIONAL ASSESSMENT: PAIN_FUNCTIONAL_ASSESSMENT: 0-10

## 2022-01-24 ASSESSMENT — PAIN SCALES - GENERAL
PAINLEVEL_OUTOF10: 0

## 2022-01-24 NOTE — OP NOTE
Operative Note      Patient: Kassandra Junior  YOB: 1970  MRN: [id number]    Date of Procedure: 1/24/2022    Pre-Op Diagnosis: Z12.11 SCREEN    Post-Op Diagnosis: Same       Procedure(s):  COLORECTAL CANCER SCREENING, NOT HIGH RISK    Surgeon(s):  Meir Schmitt IV, DO    Assistant:   Resident: Khadra Murillo DO    Anesthesia: Monitor Anesthesia Care    Estimated Blood Loss (mL): Minimal    Complications: None    Specimens:   * No specimens in log *    Implants:  * No implants in log *      Drains: * No LDAs found *    Findings: none    Detailed Description of Procedure:     HISTORY: The patient is a 46y.o. year old female with history of above preop diagnosis. I recommended colonoscopy with possible biopsy or polypectomy and I explained the risk, benefits, expected outcome, and alternatives to the procedure. Risks included but are not limited to bleeding, infection, respiratory distress, hypotension, and perforation of the colon. The patient understands and is in agreement. PROCEDURE: The patient was given IV conscious sedation per anesthesia. The patient was given 4 L of O2 /minute by nasal cannula. The patient's SPO2 remained above 90% throughout the procedure. The colonoscope was inserted per rectum and advanced under direct vision to the cecum without difficulty. Findings:  Cecum/Ascending colon: normal    Transverse colon: normal    Descending/Sigmoid colon: normal    Rectum/Anus: examined in normal and retroflexed positions and was normal    The colon was decompressed and the scope was removed. The patient tolerated the procedure well.      IMPRESSION/PLAN:   Repeat colonoscopy in 10 years or sooner if change in bowel habits    Electronically signed by Khadra Murillo DO  on 1/24/2022 at 9:43 AM

## 2022-01-24 NOTE — ANESTHESIA POSTPROCEDURE EVALUATION
POST- ANESTHESIA EVALUATION       Pt Name: Rhona Ramírez  MRN: [id number]  [place]: 1970  Date of evaluation: 1/24/2022  Time:  10:37 AM      /85   Pulse 64   Temp 97.2 °F (36.2 °C) (Temporal)   Resp 10   Ht 5' 2\" (1.575 m)   Wt 170 lb (77.1 kg)   SpO2 100%   BMI 31.09 kg/m²      Consciousness Level  Awake  Cardiopulmonary Status  Stable  Pain Adequately Treated YES  Nausea / Vomiting  NO  Adequate Hydration  YES  Anesthesia Related Complications NONE      Electronically signed by Carrie Mathis MD on 1/24/2022 at 10:37 AM       Department of Anesthesiology  Postprocedure Note    Patient: Rhona Ramírez  MRN: [id number]  YOB: 1970  Date of evaluation: 1/24/2022  Time:  10:37 AM     Procedure Summary     Date: 01/24/22 Room / Location: [address]    Anesthesia Start: 0813 Anesthesia Stop: 0132    Procedure: COLORECTAL CANCER SCREENING, NOT HIGH RISK (N/A ) Diagnosis: (Z12.11 SCREEN)    Surgeons: Shira Caraballo IV, DO Responsible Provider: Carrie Mathis MD    Anesthesia Type: MAC ASA Status: 2          Anesthesia Type: MAC    Tung Phase I: Tung Score: 9    Tung Phase II: Tung Score: 10    Last vitals: Reviewed and per EMR flowsheets.        Anesthesia Post Evaluation

## 2022-01-24 NOTE — ANESTHESIA PRE PROCEDURE
Department of Anesthesiology  Preprocedure Note       Name:  Sheri Mathis   Age:  46 y.o.  :  1970                                          MRN:  [id number]         Date:  2022      Surgeon: Miguel Santana):  Diomedes Caraballo IV, DO    Procedure: Procedure(s):  COLORECTAL CANCER SCREENING, NOT HIGH RISK    Medications prior to admission:   Prior to Admission medications    Medication Sig Start Date End Date Taking? Authorizing Provider   triamcinolone (NASACORT) 55 MCG/ACT nasal inhaler 2 sprays by Nasal route daily    Historical Provider, MD   escitalopram (LEXAPRO) 10 MG tablet Take 1 tablet by mouth daily 10/14/21   BELINDA Jiang - CNP       Current medications:    Current Facility-Administered Medications   Medication Dose Route Frequency Provider Last Rate Last Admin    0.9 % sodium chloride infusion   IntraVENous Continuous Yuliya Duran MD        lactated ringers infusion   IntraVENous Continuous Yuliya Duran  mL/hr at 22 0815 New Bag at 22 0815    sodium chloride flush 0.9 % injection 10 mL  10 mL IntraVENous 2 times per day Yuliya Duran MD        sodium chloride flush 0.9 % injection 10 mL  10 mL IntraVENous PRN Yuliya Duran MD        0.9 % sodium chloride infusion  25 mL IntraVENous PRN Yuliya Duran MD           Allergies: Allergies   Allergen Reactions    Seasonal        Problem List:    Patient Active Problem List   Diagnosis Code    Anxiety F41.9       Past Medical History:        Diagnosis Date    Difficult intubation        Past Surgical History:        Procedure Laterality Date    BREAST REDUCTION SURGERY       SECTION      x 3       Social History:    Social History     Tobacco Use    Smoking status: Never Smoker    Smokeless tobacco: Never Used   Substance Use Topics    Alcohol use:  Yes     Alcohol/week: 5.0 standard drinks     Types: 5 Glasses of wine per week     Comment: socially                                Counseling given: Not Answered      Vital Signs (Current):   Vitals:    01/24/22 0803   BP: 118/87   Pulse: 86   Resp: 16   Temp: 97.7 °F (36.5 °C)   TempSrc: Infrared   SpO2: 95%   Weight: 170 lb (77.1 kg)   Height: 5' 2\" (1.575 m)                                              BP Readings from Last 3 Encounters:   01/24/22 118/87   12/16/21 120/70   10/14/21 126/84       NPO Status: Time of last liquid consumption: 1030                        Time of last solid consumption: 1530                        Date of last liquid consumption: 01/23/22                        Date of last solid food consumption: 01/22/22    BMI:   Wt Readings from Last 3 Encounters:   01/24/22 170 lb (77.1 kg)   12/16/21 170 lb (77.1 kg)   11/04/21 173 lb (78.5 kg)     Body mass index is 31.09 kg/m². CBC: No results found for: WBC, RBC, HGB, HCT, MCV, RDW, PLT    CMP: No results found for: NA, K, CL, CO2, BUN, CREATININE, GFRAA, AGRATIO, LABGLOM, GLUCOSE, GLU, PROT, CALCIUM, BILITOT, ALKPHOS, AST, ALT    POC Tests: No results for input(s): POCGLU, POCNA, POCK, POCCL, POCBUN, POCHEMO, POCHCT in the last 72 hours.     Coags: No results found for: PROTIME, INR, APTT    HCG (If Applicable): No results found for: PREGTESTUR, PREGSERUM, HCG, HCGQUANT     ABGs: No results found for: PHART, PO2ART, UDQ1LFC, YAL0SJR, BEART, H9XLCBHI     Type & Screen (If Applicable):  No results found for: LABABO, LABRH    Drug/Infectious Status (If Applicable):  No results found for: HIV, HEPCAB    COVID-19 Screening (If Applicable): No results found for: COVID19        Anesthesia Evaluation  Patient summary reviewed and Nursing notes reviewed no history of anesthetic complications:   Airway: Mallampati: II     Neck ROM: full  Comment: Patient jonas h/o difficult intubation  Mouth opening: > = 3 FB Dental: normal exam         Pulmonary:Negative Pulmonary ROS and normal exam  breath sounds clear to auscultation                             Cardiovascular:Negative CV ROS            Rhythm: regular  Rate: normal                    Neuro/Psych:   (+) depression/anxiety             GI/Hepatic/Renal:   (+) bowel prep,           Endo/Other: Negative Endo/Other ROS                    Abdominal:       Abdomen: soft. Vascular: negative vascular ROS. Other Findings:           Anesthesia Plan      MAC     ASA 2             Anesthetic plan and risks discussed with patient. Plan discussed with CRNA.                   Tang Mckenna MD   1/24/2022

## 2022-01-24 NOTE — H&P
General Surgery:  H&P        PATIENT NAME: Beth Manning   YOB: 1970    ADMISSION DATE: 2022  7:32 AM     Admitting Provider: [unfilled]    TODAY'S DATE: 2022    Chief Complaint:  Colonoscopy     HISTORY OF PRESENT ILLNESS:  The patient is a 46 y.o. female  who presents today for screening colonoscopy. Paternal grandfather with colon cancer, unsure of age at diagnosis. No change in bowel habits. No melena or hematochezia. No n/v. Tolerated bowel prep without issue. Past Medical History:    No past medical history on file. Past Surgical History:        Procedure Laterality Date    BREAST REDUCTION SURGERY       SECTION      x 3       Medications Prior to Admission:   Medications Prior to Admission: triamcinolone (NASACORT) 55 MCG/ACT nasal inhaler, 2 sprays by Nasal route daily  escitalopram (LEXAPRO) 10 MG tablet, Take 1 tablet by mouth daily    Allergies:  Seasonal    Social History:   Social History     Socioeconomic History    Marital status: Unknown     Spouse name: Not on file    Number of children: Not on file    Years of education: Not on file    Highest education level: Not on file   Occupational History    Not on file   Tobacco Use    Smoking status: Never Smoker    Smokeless tobacco: Never Used   Vaping Use    Vaping Use: Never used   Substance and Sexual Activity    Alcohol use:  Yes     Alcohol/week: 5.0 standard drinks     Types: 5 Glasses of wine per week    Drug use: Never    Sexual activity: Yes     Partners: Male   Other Topics Concern    Not on file   Social History Narrative    Not on file     Social Determinants of Health     Financial Resource Strain: Low Risk     Difficulty of Paying Living Expenses: Not hard at all   Food Insecurity: No Food Insecurity    Worried About [address] in the Last Year: Never true    [address]  in the Last Year: Never true   Transportation Needs:     Lack of Transportation (Medical): Not on file    Lack of Transportation (Non-Medical): Not on file   Physical Activity:     Days of Exercise per Week: Not on file    Minutes of Exercise per Session: Not on file   Stress:     Feeling of Stress : Not on file   Social Connections:     Frequency of Communication with Friends and Family: Not on file    Frequency of Social Gatherings with Friends and Family: Not on file    Attends Alevism Services: Not on file    Active Member of [address] [place] [address] or Organizations: Not on file    Attends Club or Organization Meetings: Not on file    Marital Status: Not on file   Intimate Partner Violence:     Fear of Current or Ex-Partner: Not on file    Emotionally Abused: Not on file    Physically Abused: Not on file    Sexually Abused: Not on file   Housing Stability:     Unable to Pay for Housing in the Last Year: Not on file    Number of [place] in the Last Year: Not on file    Unstable Housing in the Last Year: Not on file       Family History:       Problem Relation Age of Onset    Heart Disease Father        REVIEW OF SYSTEMS:    CONSTITUTIONAL:  No recent weight gain/loss. Energy level normal for pt. HEENT:  No nasal congestion or drainage. CARDIOVASCULAR:  No chest pain  GASTROINTESTINAL:  No abdominal pain, nausea, or vomiting. No constipation/diarrhea. No rectal bleeding  GENITOURINARY:  No dysuria  HEMATOLOGIC/LYMPHATIC:  No easy bruising. No history of cancer  ENDOCRINE: negative  Review of systems negative unless listed above. PHYSICAL EXAM:    VITALS:  There were no vitals taken for this visit.   INTAKE/OUTPUT:   No intake or output data in the 24 hours ending 01/24/22 0744    CONSTITUTIONAL:  awake, alert, not distressed and mildly obese  ENT:  normocephalic/atraumatic, without obvious abnormality  NECK:  supple, symmetrical, trachea midline   LUNGS:  Equal chest rise bilaterally   CARDIOVASCULAR:  regular rate and rhythm   ABDOMEN:  soft, non-distended, non-tender to palpation  MUSCULOSKELETAL:  negative, there is not obvious somatic dysfunction  NEUROLOGIC:  Mental Status Exam:  Level of Alertness:   alert  Orientation: oriented to person, place, and time    Pertinent Radiology:   No results found. ASSESSMENT:  There are no active hospital problems to display for this patient. 3. 46 y.o. female here for screening colonoscopy     Plan:  1. Colonoscopy today. Consent obtained. All questions answered bedside. 2. Discharge to home after procedure.      Electronically signed by Fanny Vyas DO  on 1/24/2022 at 7:44 AM

## 2022-02-16 ENCOUNTER — TELEMEDICINE (OUTPATIENT)
Dept: BEHAVIORAL/MENTAL HEALTH CLINIC | Age: 52
End: 2022-02-16
Payer: COMMERCIAL

## 2022-02-16 DIAGNOSIS — F41.9 ANXIETY DISORDER, UNSPECIFIED TYPE: Primary | ICD-10-CM

## 2022-02-16 PROCEDURE — 90834 PSYTX W PT 45 MINUTES: CPT | Performed by: SOCIAL WORKER

## 2022-02-17 NOTE — PROGRESS NOTES
BEHAVIORAL HEALTH FOLLOW UP  MARYBETH Tsang  Behavioral Health Consultant      Visit Date: 2/16/2022   Time of appointment:  11am   Time spent with Patient: 50 minutes. This is patient's fourth appointment. Pt and/or guardian was educated on the model of service to include a short-term intervention focused approach and as well as the limits of confidentiality (i.e. abuse reporting, suicide intervention, etc.). Pt and/or guardian indicated understanding. Pt and/or guardian provided informed consent for the behavioral health program.  Visit completed via audio over the telephone and consent for virtual visit was given. .   Patient Location: privately in pt home, [facility]  Provider Location: [facility] office, [facility]    PCP: BELINDA Brizuela CNP      Reason for Consult: Follow-up    to address pt's Behavioral Health goal: \"get anxiety and worrying under control\"      Mayur Pacheco is a 46 y.o. female who presents for follow up of anxiety. She reports symptoms improving., She reports most of the past 6 weeks she has been doing rather well, experiencing low distress and anxiety until this past week during a difficult encounter with her mom/sister and then last night really had trouble sleeping after phone call with mom earlier in the day. OBJECTIVE  Affective and emotional state appeared unable to assess affect due to phone visit. Suicidal thoughts or behaviors not present  Homicidal thoughts or behaviors not present  Hygiene was unable to assess due to phone visit  Dress was unable to assess due to phone visit  Behavior was Unable to assess due to phone visit  Attitude was Cooperative. Eye-contact was unable to assess due to phone visit.   Speech is described as normal rate, normal volume and well articulated  Thought processes were logical without evidence of delusions, hallucinations, obsessions, or loree  Pt was oriented to person, place, time, and general circumstances with recent memory:  good and remote memory:  good. Insight and judgment are estimated to be good     PHQ Scores 10/14/2021   PHQ2 Score 0   PHQ9 Score 0     Interpretation of Total Score Depression Severity: 1-4 = Minimal depression, 5-9 = Mild depression, 10-14 = Moderate depression, 15-19 = Moderately severe depression, 20-27 = Severe depression      ASSESSMENT  Paco Needs presented to the appointment today for follow up and treatment of anxiety. She is currently deemed no risk to self or others. The main focus or themes of the visit today included improvement in communication skills, expression and discussion of unhelpful thinking patterns or schemas and reduction in anxiety, worry and panic. Garrett Roland is making progress with current treatment. She would benefit from further behavioral health consultation to address anxiety. Pt is doing well overall. With the discussion today about the recent encounter with family pt was able to work through emotions about the situation with better clarity. . We discussed option of discontinuing or further doing booster visits. Pt states she will see how she feels following a vacation in April and if feels further booster visits would help will call to schedule. Michelle Quezada was in agreement with recommendations. DIAGNOSIS  Garrett Roland was seen today for follow-up. Diagnoses and all orders for this visit:    Anxiety disorder, unspecified type        INTERVENTION  Therapeutic strategies included encouragement of expression of emotion, restructuring of problematic automatic thoughts and beliefs, exploring and encouraging use of practices that can support symptom management and personal growth in daily life , engaging pt in identifying their strengths and resources, identifying exceptions to the identified problem, therapeutic feedback regarding development and progress and review of work done by patient between visits.     PLAN  Pt will continue practicing cognitive skills to address worries/rumination and anxiety. Pt will self assess need for further booster session in April after returns from vacation       INTERACTIVE COMPLEXITY  Is interactive complexity present?  No  Reason:  N/A  Additional Supporting Information:  N/A       Electronically signed by Jenniffer Gallo on 2/17/2022 at 3:01 PM

## 2022-05-05 ENCOUNTER — HOSPITAL ENCOUNTER (OUTPATIENT)
Dept: MAMMOGRAPHY | Age: 52
Discharge: HOME | End: 2022-05-07
Payer: COMMERCIAL

## 2022-05-05 VITALS — HEIGHT: 63 IN | WEIGHT: 160 LBS | BODY MASS INDEX: 28.35 KG/M2

## 2022-05-05 DIAGNOSIS — R59.0 ENLARGED LYMPH NODES IN ARMPIT: Primary | ICD-10-CM

## 2022-05-05 DIAGNOSIS — Z12.31 ENCOUNTER FOR SCREENING MAMMOGRAM FOR BREAST CANCER: ICD-10-CM

## 2022-05-05 PROCEDURE — 77063 BREAST TOMOSYNTHESIS BI: CPT

## 2022-05-09 ENCOUNTER — HOSPITAL ENCOUNTER (OUTPATIENT)
Dept: WOMENS IMAGING | Age: 52
Discharge: HOME | End: 2022-05-11
Payer: COMMERCIAL

## 2022-05-09 DIAGNOSIS — R59.0 ENLARGED LYMPH NODES IN ARMPIT: ICD-10-CM

## 2022-05-09 PROCEDURE — 76882 US LMTD JT/FCL EVL NVASC XTR: CPT

## 2023-06-22 ENCOUNTER — OFFICE VISIT (OUTPATIENT)
Dept: PRIMARY CARE CLINIC | Age: 53
End: 2023-06-22
Payer: COMMERCIAL

## 2023-06-22 VITALS
HEART RATE: 82 BPM | HEIGHT: 63 IN | OXYGEN SATURATION: 97 % | BODY MASS INDEX: 31.71 KG/M2 | SYSTOLIC BLOOD PRESSURE: 132 MMHG | WEIGHT: 179 LBS | DIASTOLIC BLOOD PRESSURE: 82 MMHG

## 2023-06-22 DIAGNOSIS — L29.9 EAR ITCHING: ICD-10-CM

## 2023-06-22 DIAGNOSIS — Z00.00 ANNUAL PHYSICAL EXAM: Primary | ICD-10-CM

## 2023-06-22 DIAGNOSIS — Z13.21 ENCOUNTER FOR VITAMIN DEFICIENCY SCREENING: ICD-10-CM

## 2023-06-22 DIAGNOSIS — Z13.0 SCREENING, ANEMIA, DEFICIENCY, IRON: ICD-10-CM

## 2023-06-22 DIAGNOSIS — Z91.09 ENVIRONMENTAL ALLERGIES: ICD-10-CM

## 2023-06-22 DIAGNOSIS — Z13.6 SCREENING FOR CARDIOVASCULAR CONDITION: ICD-10-CM

## 2023-06-22 DIAGNOSIS — F41.9 ANXIETY: ICD-10-CM

## 2023-06-22 DIAGNOSIS — Z13.29 SCREENING FOR THYROID DISORDER: ICD-10-CM

## 2023-06-22 PROCEDURE — 99396 PREV VISIT EST AGE 40-64: CPT | Performed by: NURSE PRACTITIONER

## 2023-06-22 SDOH — ECONOMIC STABILITY: HOUSING INSECURITY
IN THE LAST 12 MONTHS, WAS THERE A TIME WHEN YOU DID NOT HAVE A STEADY PLACE TO SLEEP OR SLEPT IN A SHELTER (INCLUDING NOW)?: NO

## 2023-06-22 SDOH — ECONOMIC STABILITY: FOOD INSECURITY: WITHIN THE PAST 12 MONTHS, YOU WORRIED THAT YOUR FOOD WOULD RUN OUT BEFORE YOU GOT MONEY TO BUY MORE.: NEVER TRUE

## 2023-06-22 SDOH — ECONOMIC STABILITY: FOOD INSECURITY: WITHIN THE PAST 12 MONTHS, THE FOOD YOU BOUGHT JUST DIDN'T LAST AND YOU DIDN'T HAVE MONEY TO GET MORE.: NEVER TRUE

## 2023-06-22 SDOH — ECONOMIC STABILITY: INCOME INSECURITY: HOW HARD IS IT FOR YOU TO PAY FOR THE VERY BASICS LIKE FOOD, HOUSING, MEDICAL CARE, AND HEATING?: NOT HARD AT ALL

## 2023-06-22 ASSESSMENT — ENCOUNTER SYMPTOMS
SORE THROAT: 0
NAUSEA: 0
ABDOMINAL PAIN: 0
VOMITING: 0
COUGH: 0
WHEEZING: 0
TROUBLE SWALLOWING: 0
SINUS PRESSURE: 0
EYE ITCHING: 1
BLOOD IN STOOL: 0
CONSTIPATION: 0
DIARRHEA: 0
SHORTNESS OF BREATH: 0

## 2023-06-22 ASSESSMENT — PATIENT HEALTH QUESTIONNAIRE - PHQ9
SUM OF ALL RESPONSES TO PHQ9 QUESTIONS 1 & 2: 0
SUM OF ALL RESPONSES TO PHQ QUESTIONS 1-9: 0
SUM OF ALL RESPONSES TO PHQ QUESTIONS 1-9: 0
2. FEELING DOWN, DEPRESSED OR HOPELESS: 0
1. LITTLE INTEREST OR PLEASURE IN DOING THINGS: 0
SUM OF ALL RESPONSES TO PHQ QUESTIONS 1-9: 0
SUM OF ALL RESPONSES TO PHQ QUESTIONS 1-9: 0

## 2023-06-22 NOTE — PROGRESS NOTES
[address] PRIMARY CARE  [address] [facility] [address]  [address]   Dept: [phone]  Dept Fax: [phone]    Shashi Poe is a 46 y.o. female who presentstoday for her medical conditions/complaints as noted below. Shashi Poe is c/o of  Chief Complaint   Patient presents with    Annual Exam           HPI:     Here today for annual exam  Has not been in for over a year and a half  States feeling well   No longer taking lexapro  Reports found counseling helpful but has been done with that for about a year now    Currently working on increasing physical activity as she works at home and has found herself to be increasingly sedentary  She enjoys yoga but also trying to do more cardio    Complains of bilateral itching in the ears seasonally  Oral antihistamines tend to dry her mouth out so she has not been taking these  Admits using Q-tips as this help the itching but she also notices she has a lot of ear wax      No results found for: LABA1C          ( goal A1C is < 7)   No results found for: LABMICR  No results found for: LDLCHOLESTEROL, LDLCALC    (goal LDL is <100)   No results found for: AST, ALT, BUN, CR  BP Readings from Last 3 Encounters:   23 132/82   22 (!) 72/44   22 107/85          ([id number])    Past Medical History:   Diagnosis Date    Difficult intubation       Past Surgical History:   Procedure Laterality Date    BREAST REDUCTION SURGERY       SECTION      x 3    COLONOSCOPY N/A 2022    COLORECTAL CANCER SCREENING, NOT HIGH RISK performed by Wendy Caraballo IV, DO at [address] [facility].       Family History   Problem Relation Age of Onset    Heart Disease Father     Breast Cancer Maternal Grandmother 61          Social History     Tobacco Use    Smoking status: Never    Smokeless tobacco: Never   Substance Use Topics    Alcohol use:  Yes     Alcohol/week: 5.0 standard drinks     Types: 5 Glasses of wine per

## 2023-08-02 ENCOUNTER — OFFICE VISIT (OUTPATIENT)
Dept: OBGYN CLINIC | Age: 53
End: 2023-08-02
Payer: COMMERCIAL

## 2023-08-02 ENCOUNTER — HOSPITAL ENCOUNTER (OUTPATIENT)
Age: 53
Setting detail: SPECIMEN
Discharge: HOME | End: 2023-08-02

## 2023-08-02 VITALS
DIASTOLIC BLOOD PRESSURE: 80 MMHG | WEIGHT: 178 LBS | SYSTOLIC BLOOD PRESSURE: 120 MMHG | HEIGHT: 63 IN | BODY MASS INDEX: 31.54 KG/M2

## 2023-08-02 DIAGNOSIS — Z01.419 ENCOUNTER FOR ANNUAL ROUTINE GYNECOLOGICAL EXAMINATION: Primary | ICD-10-CM

## 2023-08-02 DIAGNOSIS — Z12.31 VISIT FOR SCREENING MAMMOGRAM: ICD-10-CM

## 2023-08-02 DIAGNOSIS — Z13.820 SCREENING FOR OSTEOPOROSIS: ICD-10-CM

## 2023-08-02 PROCEDURE — 99396 PREV VISIT EST AGE 40-64: CPT | Performed by: NURSE PRACTITIONER

## 2023-08-02 ASSESSMENT — ENCOUNTER SYMPTOMS
COLOR CHANGE: 0
VOMITING: 0
NAUSEA: 0
COUGH: 0
SHORTNESS OF BREATH: 0

## 2023-08-02 NOTE — PROGRESS NOTES
Huey Jalloh is a 46 y.o.  here for her annual exam.  The patient was seen and examined. The patients past medical, surgical, social and family history were reviewed. Current medications and allergies were reviewed, and documented in the chart. She works full-time as a   Reports her spouse is retired/on disability due to  [facility] [employer]  She has 3 children 2 graduated college and one graduates this upcoming year        Tobacco abuse No     Last PAP: -negative hx of abnormal PAP no  Family hx uterine or ovarian cancer-denies  Last mammogram if indicated-MAy 2022 enlarged bilateral axillary lymph nodes noted had bilateral axillary US and  No evidence of ultrasound abnormality of the lymph nodes in the right axilla  or the left axillaFamily hx of breast cancer -MGM   Has never had dexa scan, hx foot fx 30 years ago from trauma  Colon cancer screening if indicated-colonoscopy 22 family hx colon cancer -denies           Sexually active: yes. Dyspareunia: Dryness uses lubricants help  Vaginal discharge: no,  UTI symptoms: no, voiding difficulties: no, bowels regular:Yes bloating:no        Menstrual history: Postmenopausal- LMP 4849-0697?   Denies vaginal bleeding  Has noted some increase in hot flashes but not as severe as previously will keep food diary and try OTC estroven    OB History    Para Term  AB Living   4 3 3   1 3   SAB IAB Ectopic Molar Multiple Live Births   1                # Outcome Date GA Lbr Adarsh/2nd Weight Sex Delivery Anes PTL Lv   4 Term      CS-Unspec      3 Term      CS-Unspec      2 Term      CS-Unspec      1 SAB                Vitals:    23 1431   BP: 120/80   Site: Right Upper Arm   Position: Sitting   Cuff Size: Medium Adult   Weight: 178 lb (80.7 kg)   Height: 5' 3\" (1.6 m)       Wt Readings from Last 3 Encounters:   23 178 lb (80.7 kg)   23 179 lb (81.2 kg)   22 160 lb (72.6 kg)     Past Medical History:   Diagnosis Date    Difficult

## 2023-08-07 LAB
HPV I/H RISK 4 DNA CVX QL NAA+PROBE: NOT DETECTED
HPV SAMPLE: NORMAL
HPV, INTERPRETATION: NORMAL
HPV16 DNA CVX QL NAA+PROBE: NOT DETECTED
HPV18 DNA CVX QL NAA+PROBE: NOT DETECTED
SPECIMEN DESCRIPTION: NORMAL

## 2023-08-08 LAB — CYTOLOGY REPORT: NORMAL

## 2025-01-28 ENCOUNTER — TELEPHONE (OUTPATIENT)
Dept: OBGYN CLINIC | Age: 55
End: 2025-01-28

## 2025-01-30 ASSESSMENT — PATIENT HEALTH QUESTIONNAIRE - PHQ9
SUM OF ALL RESPONSES TO PHQ9 QUESTIONS 1 & 2: 0
2. FEELING DOWN, DEPRESSED OR HOPELESS: NOT AT ALL
SUM OF ALL RESPONSES TO PHQ9 QUESTIONS 1 & 2: 0
2. FEELING DOWN, DEPRESSED OR HOPELESS: NOT AT ALL
SUM OF ALL RESPONSES TO PHQ QUESTIONS 1-9: 0
SUM OF ALL RESPONSES TO PHQ QUESTIONS 1-9: 0
1. LITTLE INTEREST OR PLEASURE IN DOING THINGS: NOT AT ALL
1. LITTLE INTEREST OR PLEASURE IN DOING THINGS: NOT AT ALL
SUM OF ALL RESPONSES TO PHQ QUESTIONS 1-9: 0
SUM OF ALL RESPONSES TO PHQ QUESTIONS 1-9: 0

## 2025-02-06 ENCOUNTER — OFFICE VISIT (OUTPATIENT)
Dept: PRIMARY CARE CLINIC | Age: 55
End: 2025-02-06
Payer: COMMERCIAL

## 2025-02-06 VITALS
BODY MASS INDEX: 31.34 KG/M2 | DIASTOLIC BLOOD PRESSURE: 78 MMHG | SYSTOLIC BLOOD PRESSURE: 118 MMHG | HEIGHT: 63 IN | OXYGEN SATURATION: 98 % | HEART RATE: 86 BPM | WEIGHT: 176.9 LBS

## 2025-02-06 DIAGNOSIS — Z00.00 ANNUAL PHYSICAL EXAM: Primary | ICD-10-CM

## 2025-02-06 DIAGNOSIS — Z12.31 SCREENING MAMMOGRAM FOR BREAST CANCER: ICD-10-CM

## 2025-02-06 PROCEDURE — 99396 PREV VISIT EST AGE 40-64: CPT | Performed by: NURSE PRACTITIONER

## 2025-02-06 SDOH — ECONOMIC STABILITY: FOOD INSECURITY: WITHIN THE PAST 12 MONTHS, YOU WORRIED THAT YOUR FOOD WOULD RUN OUT BEFORE YOU GOT MONEY TO BUY MORE.: NEVER TRUE

## 2025-02-06 SDOH — ECONOMIC STABILITY: FOOD INSECURITY: WITHIN THE PAST 12 MONTHS, THE FOOD YOU BOUGHT JUST DIDN'T LAST AND YOU DIDN'T HAVE MONEY TO GET MORE.: NEVER TRUE

## 2025-02-06 ASSESSMENT — ENCOUNTER SYMPTOMS
VOMITING: 0
DIARRHEA: 0
COUGH: 0
SORE THROAT: 0
SINUS PRESSURE: 0
BLOOD IN STOOL: 0
CONSTIPATION: 0
ABDOMINAL PAIN: 0
TROUBLE SWALLOWING: 0
WHEEZING: 0
NAUSEA: 0
SHORTNESS OF BREATH: 0

## 2025-02-06 NOTE — PROGRESS NOTES
[facility]  [facility] PRIMARY CARE  [address] [place] [address]  [address]  [facility] [address]  Dept: [phone]  Dept Fax: [phone]    Mariella Miller is a 54 y.o. female who presentstoday for her medical conditions/complaints as noted below.  Mariella Miller is c/o of  Chief Complaint   Patient presents with    Annual Exam    Health Maintenance     Due for RYNE         HPI:     History of Present Illness  The patient presents for annual exam  She reports no current health concerns and is generally feeling well. She has an upcoming appointment with her obstetrician in April 2025 and expresses a desire to have her mammogram conducted prior to this visit. A comprehensive blood panel was recently ordered through her employer's life insurance policy, but the results are not yet available. She plans to upload these results to her [employer] account once they are received. She does not engage in regular physical exercise due to her sedentary occupation as a , which involves prolonged sitting for 12 to 13 hours daily. She reports no edema in her lower extremities.  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 Despite this, she has received one subsequent influenza vaccine without any adverse reactions. However, she has not received a recent influenza vaccine and declines it today due to impending travel plans. She recalls experiencing severe pain during the administration of the influenza vaccine that triggered her Guillain-Gwynedd syndrome, suspecting it may have been incorrectly administered into a nerve or muscle. Immediate numbness in her arm was noted post-vaccination.      No results found for: \"LABA1C\"          ( goal A1C is < 7)   No components found for: \"LABMICR\"  No components found for: \"LDLCHOLESTEROL\", \"LDLCALC\"    (goal LDL is <100)   No results found for: \"AST\", \"ALT\",

## 2025-02-17 ENCOUNTER — HOSPITAL ENCOUNTER (OUTPATIENT)
Dept: MAMMOGRAPHY | Age: 55
Discharge: HOME | End: 2025-02-19
Payer: COMMERCIAL

## 2025-02-17 DIAGNOSIS — Z12.31 SCREENING MAMMOGRAM FOR BREAST CANCER: ICD-10-CM

## 2025-02-17 PROCEDURE — 77063 BREAST TOMOSYNTHESIS BI: CPT

## 2025-03-11 ENCOUNTER — OFFICE VISIT (OUTPATIENT)
Dept: PRIMARY CARE CLINIC | Age: 55
End: 2025-03-11
Payer: COMMERCIAL

## 2025-03-11 VITALS
WEIGHT: 181.2 LBS | HEIGHT: 63 IN | DIASTOLIC BLOOD PRESSURE: 84 MMHG | OXYGEN SATURATION: 98 % | SYSTOLIC BLOOD PRESSURE: 114 MMHG | BODY MASS INDEX: 32.11 KG/M2 | HEART RATE: 81 BPM | RESPIRATION RATE: 14 BRPM

## 2025-03-11 DIAGNOSIS — J01.40 ACUTE NON-RECURRENT PANSINUSITIS: ICD-10-CM

## 2025-03-11 DIAGNOSIS — H60.393 OTHER INFECTIVE ACUTE OTITIS EXTERNA OF BOTH EARS: Primary | ICD-10-CM

## 2025-03-11 PROCEDURE — 99213 OFFICE O/P EST LOW 20 MIN: CPT | Performed by: NURSE PRACTITIONER

## 2025-03-11 RX ORDER — CIPROFLOXACIN AND DEXAMETHASONE 3; 1 MG/ML; MG/ML
4 SUSPENSION/ DROPS AURICULAR (OTIC) 2 TIMES DAILY
Qty: 7.5 ML | Refills: 0 | Status: SHIPPED | OUTPATIENT
Start: 2025-03-11 | End: 2025-03-18

## 2025-03-11 RX ORDER — AMOXICILLIN 875 MG/1
875 TABLET, COATED ORAL 2 TIMES DAILY
Qty: 14 TABLET | Refills: 0 | Status: SHIPPED | OUTPATIENT
Start: 2025-03-11 | End: 2025-03-18

## 2025-03-11 SDOH — ECONOMIC STABILITY: FOOD INSECURITY: WITHIN THE PAST 12 MONTHS, YOU WORRIED THAT YOUR FOOD WOULD RUN OUT BEFORE YOU GOT MONEY TO BUY MORE.: NEVER TRUE

## 2025-03-11 SDOH — ECONOMIC STABILITY: FOOD INSECURITY: WITHIN THE PAST 12 MONTHS, THE FOOD YOU BOUGHT JUST DIDN'T LAST AND YOU DIDN'T HAVE MONEY TO GET MORE.: NEVER TRUE

## 2025-03-11 ASSESSMENT — ENCOUNTER SYMPTOMS
NAUSEA: 0
TROUBLE SWALLOWING: 0
ABDOMINAL PAIN: 0
CONSTIPATION: 0
WHEEZING: 0
VOMITING: 0
SORE THROAT: 0
DIARRHEA: 0
COUGH: 0
BLOOD IN STOOL: 0
SINUS PRESSURE: 1
SHORTNESS OF BREATH: 0

## 2025-03-11 ASSESSMENT — PATIENT HEALTH QUESTIONNAIRE - PHQ9
2. FEELING DOWN, DEPRESSED OR HOPELESS: NOT AT ALL
1. LITTLE INTEREST OR PLEASURE IN DOING THINGS: NOT AT ALL
SUM OF ALL RESPONSES TO PHQ QUESTIONS 1-9: 0

## 2025-03-11 NOTE — PROGRESS NOTES
[facility]  [facility] PRIMARY CARE  [address] [place] [address]  [address]  [facility] [address]  Dept: [phone]  Dept Fax: [phone]    Mariella Miller is a 54 y.o. female who presentstoday for her medical conditions/complaints as noted below.  Mariella Miller is c/o of  Chief Complaint   Patient presents with    Ear Fullness     Itching, flaky x 3 days      Sinusitis     Head pressure x 1 day          HPI:     History of Present Illness  The patient presents for evaluation of ear discomfort.    She reports experiencing significant sinus pressure upon awakening today, describing the sensation as akin to having a bowling ball in her head. She has been experiencing ear discomfort since Saturday, following a flight, initially manifesting as severe itchiness and watery drainage from her ears. She does not report any associated pain but acknowledges a general sense of discomfort. She recently returned from a week-long trip to [place], where she engaged in swimming activities, leading her to suspect swimmer's ear. Yesterday, she began experiencing a mild headache, which has persisted into today. She also reports postnasal drip but does not have a runny nose. She does not experience any sinus pressure or pain behind her eyes but does report pressure in her forehead, jaw, and ears. She does not have a sore throat but admits to a slight cough, which she attributes to the nasal drip. She reports feeling fatigued today. Her ears are extremely sensitive, with the left ear causing more discomfort than the right. She has been using over-the-counter eardrops for relief.       No results found for: \"LABA1C\"          ( goal A1C is < 7)   No components found for: \"LABMICR\"  No components found for: \"LDLCHOLESTEROL\", \"LDLCALC\"    (goal LDL is <100)   No results found for: \"AST\", \"ALT\", \"BUN\", \"CR\"  BP Readings from Last 3 Encounters:   03/11/25 114/84   02/06/25 118/78   08/02/23 120/80

## 2025-04-16 ENCOUNTER — OFFICE VISIT (OUTPATIENT)
Dept: OBGYN CLINIC | Age: 55
End: 2025-04-16
Payer: COMMERCIAL

## 2025-04-16 ENCOUNTER — HOSPITAL ENCOUNTER (OUTPATIENT)
Age: 55
Setting detail: SPECIMEN
Discharge: HOME | End: 2025-04-16

## 2025-04-16 VITALS
RESPIRATION RATE: 14 BRPM | WEIGHT: 176 LBS | HEIGHT: 63 IN | DIASTOLIC BLOOD PRESSURE: 76 MMHG | BODY MASS INDEX: 31.18 KG/M2 | SYSTOLIC BLOOD PRESSURE: 112 MMHG

## 2025-04-16 DIAGNOSIS — Z78.0 POSTMENOPAUSAL: ICD-10-CM

## 2025-04-16 DIAGNOSIS — N94.10 DYSPAREUNIA IN FEMALE: ICD-10-CM

## 2025-04-16 DIAGNOSIS — Z13.820 SCREENING FOR OSTEOPOROSIS: ICD-10-CM

## 2025-04-16 DIAGNOSIS — N95.2 VAGINAL ATROPHY: ICD-10-CM

## 2025-04-16 DIAGNOSIS — Z01.419 WELL WOMAN EXAM WITH ROUTINE GYNECOLOGICAL EXAM: Primary | ICD-10-CM

## 2025-04-16 PROCEDURE — 99213 OFFICE O/P EST LOW 20 MIN: CPT | Performed by: NURSE PRACTITIONER

## 2025-04-16 PROCEDURE — G8417 CALC BMI ABV UP PARAM F/U: HCPCS | Performed by: NURSE PRACTITIONER

## 2025-04-16 PROCEDURE — 99396 PREV VISIT EST AGE 40-64: CPT | Performed by: NURSE PRACTITIONER

## 2025-04-16 PROCEDURE — 1036F TOBACCO NON-USER: CPT | Performed by: NURSE PRACTITIONER

## 2025-04-16 PROCEDURE — G8427 DOCREV CUR MEDS BY ELIG CLIN: HCPCS | Performed by: NURSE PRACTITIONER

## 2025-04-16 PROCEDURE — 3017F COLORECTAL CA SCREEN DOC REV: CPT | Performed by: NURSE PRACTITIONER

## 2025-04-16 RX ORDER — ESTRADIOL 0.1 MG/G
CREAM VAGINAL
Qty: 42.5 G | Refills: 3 | Status: SHIPPED | OUTPATIENT
Start: 2025-04-16

## 2025-04-16 ASSESSMENT — ENCOUNTER SYMPTOMS
COUGH: 0
SHORTNESS OF BREATH: 0
COLOR CHANGE: 0
NAUSEA: 0
VOMITING: 0

## 2025-04-16 NOTE — PATIENT INSTRUCTIONS
Preventing Osteoporosis: Care Instructions  Your Care Instructions    Osteoporosis means the bones are weak and thin enough that they can break easily. The older you are, the more likely you are to get osteoporosis. But with plenty of calcium, vitamin D, and exercise, you can help prevent osteoporosis.  The preteen and teen years are a key time for bone building. With the help of calcium, vitamin D, and exercise in those early years and beyond, the bones reach their peak density and strength by age 30. After age 30, your bones naturally start to thin and weaken.  The stronger your bones are at around age 30, the lower your risk for osteoporosis. But no matter what your age and risk are, your bones still need calcium, vitamin D, and exercise to stay strong. Also avoid smoking, and limit alcohol. Smoking and heavy alcohol use can make your bones thinner.  Talk to your doctor about any special risks you might have, such as having a close relative with osteoporosis or taking a medicine that can weaken bones. Your doctor can tell you the best ways to protect your bones from thinning.  Follow-up care is a key part of your treatment and safety. Be sure to make and go to all appointments, and call your doctor if you are having problems. It's also a good idea to know your test results and keep a list of the medicines you take.  How can you care for yourself at home?  Get enough calcium and vitamin D. The [employer] recommends adults younger than age 51 need 1,000 mg of calcium and 600 IU of vitamin D each day. Women ages 51 to 70 need 1,200 mg of calcium and 600 IU of vitamin D each day. Men ages 51 to 70 need 1,000 mg of calcium and 600 IU of vitamin D each day. Adults 71 and older need 1,200 mg of calcium and 800 IU of vitamin D each day.  Eat foods rich in calcium, like yogurt, cheese, milk, and dark green vegetables.  Eat foods rich in vitamin D, like eggs, fatty fish, cereal, and fortified milk.  Get some

## 2025-04-16 NOTE — PROGRESS NOTES
Mariella Miller is a 54 y.o.  here for her annual exam.  The patient was seen and examined.The patients past medical, surgical, social and family history were reviewed.  Current medications and allergies were reviewed, and documented in the chart.      She works full-time as a   Reports her spouse is retired/on disability due to MS  She has 3 children         Tobacco abuse No     Last PAP: - NILM, neg HPV  hx of abnormal PAP no  Family hx uterine or ovarian cancer-denies  Last mammogram if indicated-2024- negative Family hx of breast cancer -MGM   Has never had dexa scan, hx foot fx 30 years ago from trauma  Colon cancer screening if indicated-colonoscopy 22 family hx colon cancer -denies               UTI symptoms: no, voiding difficulties: no, bowels regular:Yes bloating:no     Menstrual history: Postmenopausal- LMP 7664-4375?         Sexually active: yes. Dyspareunia: She states she has had significant vaginal dryness and dyspareunia.  She has used lubricants in the past.  She has tried Replens vaginal moisturizer but caused more irritation.  She denies abnormal vaginal discharge odor or itching.  She denies any vaginal bleeding.  She states otherwise she feels like her hot flashes are well-managed.  She has never tried vaginal estrogen.    She denies personal hx Breast cancer, VTE, or thrombophilia.  Denies hx HTN or CVA, does not smoke tobacco.         OB History    Para Term  AB Living   4 3 3  1 3   SAB IAB Ectopic Molar Multiple Live Births   1     3      # Outcome Date GA Lbr Adarsh/2nd Weight Sex Type Anes PTL Lv   4 Term      CS-Unspec      3 Term      CS-Unspec      2 Term      CS-Unspec      1 SAB                Vitals:    25 1347   BP: 112/76   BP Site: Left Upper Arm   Patient Position: Sitting   BP Cuff Size: Medium Adult   Resp: 14   Weight: 79.8 kg (176 lb)   Height: 1.6 m (5' 3\")       Wt Readings from Last 3 Encounters:   25 79.8 kg (176 lb)

## 2025-05-01 ENCOUNTER — RESULTS FOLLOW-UP (OUTPATIENT)
Dept: OBGYN CLINIC | Age: 55
End: 2025-05-01

## 2025-05-02 LAB — CYTOLOGY REPORT: NORMAL

## 2025-05-19 ENCOUNTER — HOSPITAL ENCOUNTER (OUTPATIENT)
Dept: MAMMOGRAPHY | Age: 55
Discharge: HOME | End: 2025-05-21
Payer: COMMERCIAL

## 2025-05-19 DIAGNOSIS — Z13.820 SCREENING FOR OSTEOPOROSIS: ICD-10-CM

## 2025-05-19 DIAGNOSIS — Z78.0 POSTMENOPAUSAL: ICD-10-CM

## 2025-05-19 PROCEDURE — 77080 DXA BONE DENSITY AXIAL: CPT

## 2025-05-27 ENCOUNTER — RESULTS FOLLOW-UP (OUTPATIENT)
Dept: OBGYN CLINIC | Age: 55
End: 2025-05-27

## 2025-07-15 ENCOUNTER — OFFICE VISIT (OUTPATIENT)
Dept: OBGYN CLINIC | Age: 55
End: 2025-07-15
Payer: COMMERCIAL

## 2025-07-15 VITALS
SYSTOLIC BLOOD PRESSURE: 108 MMHG | HEIGHT: 63 IN | HEART RATE: 82 BPM | DIASTOLIC BLOOD PRESSURE: 72 MMHG | BODY MASS INDEX: 31.96 KG/M2 | WEIGHT: 180.4 LBS

## 2025-07-15 DIAGNOSIS — N94.10 DYSPAREUNIA IN FEMALE: ICD-10-CM

## 2025-07-15 DIAGNOSIS — Z79.899 ENCOUNTER FOR MEDICATION MANAGEMENT: ICD-10-CM

## 2025-07-15 DIAGNOSIS — M85.80 OSTEOPENIA, UNSPECIFIED LOCATION: ICD-10-CM

## 2025-07-15 DIAGNOSIS — Z78.0 POSTMENOPAUSAL: ICD-10-CM

## 2025-07-15 DIAGNOSIS — N95.2 VAGINAL ATROPHY: Primary | ICD-10-CM

## 2025-07-15 DIAGNOSIS — Z76.89 ENCOUNTER TO ESTABLISH CARE: ICD-10-CM

## 2025-07-15 PROCEDURE — G8427 DOCREV CUR MEDS BY ELIG CLIN: HCPCS | Performed by: NURSE PRACTITIONER

## 2025-07-15 PROCEDURE — 99214 OFFICE O/P EST MOD 30 MIN: CPT | Performed by: NURSE PRACTITIONER

## 2025-07-15 PROCEDURE — 1036F TOBACCO NON-USER: CPT | Performed by: NURSE PRACTITIONER

## 2025-07-15 PROCEDURE — 3017F COLORECTAL CA SCREEN DOC REV: CPT | Performed by: NURSE PRACTITIONER

## 2025-07-15 PROCEDURE — G8417 CALC BMI ABV UP PARAM F/U: HCPCS | Performed by: NURSE PRACTITIONER

## 2025-07-15 RX ORDER — ESTRADIOL 0.1 MG/G
CREAM VAGINAL
Qty: 42.5 G | Refills: 6 | Status: SHIPPED | OUTPATIENT
Start: 2025-07-15

## 2025-07-15 NOTE — PROGRESS NOTES
Subjective:  Mariella is in for     Mariella had been here for her annual exam in April 2025 and had been noting significant vaginal dryness and dyspareunia she had used Abreva in the past tried Replens vaginal moisturizer but noted more irritation denied any abnormal vaginal discharge odor or itching or vaginal bleeding.  She denied noting significant hot flashes she had never tried vaginal estrogen in the past.  She has been using vaginal estrogen since April and she has noted significant improvement in regards to vaginal dryness dyspareunia and irritation.  She denies any vaginal bleeding.  She sometimes she does feel like that she has noted some cramping after she uses the insert but never any bleeding.  Denies abnormal discharge or odor or itching.  Denies UTI symptoms.  She denies chest pain, S.O.B., headaches, calf tenderness, DVT prevention reviewed. Risk of estrogen Breast cancer, and DVT reviewed patient aware of risk .     She also had first dexa scan completed in May 2025 it was + osteopenia     FRAX 10-YEAR PROBABILITY OF FRACTURE:     10-year fracture risk is performed using the [employer] FRAX  calculator based on patient-reported risk factors.     Major osteoporotic fracture: 13.9%  Hip fracture: 2.2%    hx foot fx 30 years ago from trauma     Review of systems per HPI, otherwise negative.    Allergies; medications; past medical, surgical, family, and social history; and problem list reviewed as indicated in this encounter.    Objective:  Vitals:  Blood pressure 108/72, pulse 82, height 1.6 m (5' 3\"), weight 81.8 kg (180 lb 6.4 oz).  Constitutional: She is oriented to person, place, and time.  She appears well-developed and well-nourished and in no acute distress.   Cardiovascular: Normal rate and regular rhythm, no murmur, rub, or gallop    Pulmonary/Chest: Effort normal and breath sounds normal. No rales or wheezes.  No chest retraction.  Abdomen: soft, non tender  Extremities: no

## (undated) DEVICE — 4-PORT MANIFOLD: Brand: NEPTUNE 2

## (undated) DEVICE — ADAPTER,CATHETER/SYRINGE/LUER,STERILE: Brand: MEDLINE

## (undated) DEVICE — GOWN,AURORA,NONRNF,XL,30/CS: Brand: MEDLINE

## (undated) DEVICE — CONNECTOR TBNG AUX H2O JET DISP FOR OLY 160/180 SER

## (undated) DEVICE — TUBING, SUCTION, 3/16" X 10', STRAIGHT: Brand: MEDLINE

## (undated) DEVICE — JELLY,LUBE,STERILE,FLIP TOP,TUBE,2-OZ: Brand: MEDLINE

## (undated) DEVICE — BASIN EMSIS 700ML GRAPHITE PLAS KID SHP GRAD

## (undated) DEVICE — FLUFF UNDERPAD,MODERATE: Brand: WINGS

## (undated) DEVICE — Device: Brand: DEFENDO VALVE AND CONNECTOR KIT

## (undated) DEVICE — SYRINGE MED 50ML LUERLOCK TIP

## (undated) DEVICE — SPONGE GZ W4XL4IN RAYON POLY FILL CVR W/ NONWOVEN FAB